# Patient Record
Sex: FEMALE | Race: BLACK OR AFRICAN AMERICAN | NOT HISPANIC OR LATINO | Employment: FULL TIME | URBAN - METROPOLITAN AREA
[De-identification: names, ages, dates, MRNs, and addresses within clinical notes are randomized per-mention and may not be internally consistent; named-entity substitution may affect disease eponyms.]

---

## 2018-08-25 ENCOUNTER — HOSPITAL ENCOUNTER (EMERGENCY)
Facility: HOSPITAL | Age: 48
Discharge: HOME | End: 2018-08-25
Attending: EMERGENCY MEDICINE | Admitting: EMERGENCY MEDICINE
Payer: COMMERCIAL

## 2018-08-25 ENCOUNTER — APPOINTMENT (EMERGENCY)
Dept: RADIOLOGY | Facility: HOSPITAL | Age: 48
End: 2018-08-25
Payer: COMMERCIAL

## 2018-08-25 ENCOUNTER — OFFICE VISIT (OUTPATIENT)
Dept: URGENT CARE | Facility: CLINIC | Age: 48
End: 2018-08-25

## 2018-08-25 VITALS
WEIGHT: 201.28 LBS | HEART RATE: 77 BPM | TEMPERATURE: 98.6 F | RESPIRATION RATE: 20 BRPM | OXYGEN SATURATION: 98 % | DIASTOLIC BLOOD PRESSURE: 76 MMHG | SYSTOLIC BLOOD PRESSURE: 128 MMHG | HEIGHT: 65 IN | BODY MASS INDEX: 33.54 KG/M2

## 2018-08-25 VITALS
RESPIRATION RATE: 20 BRPM | OXYGEN SATURATION: 98 % | SYSTOLIC BLOOD PRESSURE: 146 MMHG | HEART RATE: 83 BPM | DIASTOLIC BLOOD PRESSURE: 89 MMHG

## 2018-08-25 DIAGNOSIS — R07.9 CHEST PAIN: Primary | ICD-10-CM

## 2018-08-25 DIAGNOSIS — R07.9 CHEST PAIN, UNSPECIFIED TYPE: Primary | ICD-10-CM

## 2018-08-25 DIAGNOSIS — R11.0 NAUSEA: ICD-10-CM

## 2018-08-25 LAB
ALBUMIN SERPL BCP-MCNC: 3.7 G/DL (ref 3.5–5)
ALP SERPL-CCNC: 136 U/L (ref 46–116)
ALT SERPL W P-5'-P-CCNC: 43 U/L (ref 12–78)
ANION GAP SERPL CALCULATED.3IONS-SCNC: 7 MMOL/L (ref 4–13)
AST SERPL W P-5'-P-CCNC: 38 U/L (ref 5–45)
BASOPHILS # BLD AUTO: 0.02 THOUSANDS/ΜL (ref 0–0.1)
BASOPHILS NFR BLD AUTO: 0 % (ref 0–1)
BILIRUB SERPL-MCNC: 0.4 MG/DL (ref 0.2–1)
BUN SERPL-MCNC: 8 MG/DL (ref 5–25)
CALCIUM SERPL-MCNC: 9.4 MG/DL (ref 8.3–10.1)
CHLORIDE SERPL-SCNC: 106 MMOL/L (ref 100–108)
CO2 SERPL-SCNC: 31 MMOL/L (ref 21–32)
CREAT SERPL-MCNC: 0.88 MG/DL (ref 0.6–1.3)
EOSINOPHIL # BLD AUTO: 0.15 THOUSAND/ΜL (ref 0–0.61)
EOSINOPHIL NFR BLD AUTO: 3 % (ref 0–6)
ERYTHROCYTE [DISTWIDTH] IN BLOOD BY AUTOMATED COUNT: 13 % (ref 11.6–15.1)
EXT PREG TEST URINE: NEGATIVE
GFR SERPL CREATININE-BSD FRML MDRD: 91 ML/MIN/1.73SQ M
GLUCOSE SERPL-MCNC: 83 MG/DL (ref 65–140)
HCT VFR BLD AUTO: 44.5 % (ref 34.8–46.1)
HGB BLD-MCNC: 14.2 G/DL (ref 11.5–15.4)
IMM GRANULOCYTES # BLD AUTO: 0.02 THOUSAND/UL (ref 0–0.2)
IMM GRANULOCYTES NFR BLD AUTO: 0 % (ref 0–2)
LIPASE SERPL-CCNC: 175 U/L (ref 73–393)
LYMPHOCYTES # BLD AUTO: 0.97 THOUSANDS/ΜL (ref 0.6–4.47)
LYMPHOCYTES NFR BLD AUTO: 19 % (ref 14–44)
MCH RBC QN AUTO: 30.5 PG (ref 26.8–34.3)
MCHC RBC AUTO-ENTMCNC: 31.9 G/DL (ref 31.4–37.4)
MCV RBC AUTO: 96 FL (ref 82–98)
MONOCYTES # BLD AUTO: 0.73 THOUSAND/ΜL (ref 0.17–1.22)
MONOCYTES NFR BLD AUTO: 14 % (ref 4–12)
NEUTROPHILS # BLD AUTO: 3.33 THOUSANDS/ΜL (ref 1.85–7.62)
NEUTS SEG NFR BLD AUTO: 64 % (ref 43–75)
NRBC BLD AUTO-RTO: 0 /100 WBCS
PLATELET # BLD AUTO: 258 THOUSANDS/UL (ref 149–390)
PMV BLD AUTO: 9.6 FL (ref 8.9–12.7)
POTASSIUM SERPL-SCNC: 3.7 MMOL/L (ref 3.5–5.3)
PROT SERPL-MCNC: 8.5 G/DL (ref 6.4–8.2)
RBC # BLD AUTO: 4.66 MILLION/UL (ref 3.81–5.12)
SODIUM SERPL-SCNC: 144 MMOL/L (ref 136–145)
TROPONIN I SERPL-MCNC: <0.02 NG/ML
TROPONIN I SERPL-MCNC: <0.02 NG/ML
WBC # BLD AUTO: 5.22 THOUSAND/UL (ref 4.31–10.16)

## 2018-08-25 PROCEDURE — 96361 HYDRATE IV INFUSION ADD-ON: CPT

## 2018-08-25 PROCEDURE — 71046 X-RAY EXAM CHEST 2 VIEWS: CPT

## 2018-08-25 PROCEDURE — 99213 OFFICE O/P EST LOW 20 MIN: CPT | Performed by: PHYSICIAN ASSISTANT

## 2018-08-25 PROCEDURE — 80053 COMPREHEN METABOLIC PANEL: CPT | Performed by: EMERGENCY MEDICINE

## 2018-08-25 PROCEDURE — 83690 ASSAY OF LIPASE: CPT | Performed by: EMERGENCY MEDICINE

## 2018-08-25 PROCEDURE — 99285 EMERGENCY DEPT VISIT HI MDM: CPT

## 2018-08-25 PROCEDURE — 96375 TX/PRO/DX INJ NEW DRUG ADDON: CPT

## 2018-08-25 PROCEDURE — 85025 COMPLETE CBC W/AUTO DIFF WBC: CPT | Performed by: EMERGENCY MEDICINE

## 2018-08-25 PROCEDURE — 36415 COLL VENOUS BLD VENIPUNCTURE: CPT | Performed by: EMERGENCY MEDICINE

## 2018-08-25 PROCEDURE — 93005 ELECTROCARDIOGRAM TRACING: CPT | Performed by: PHYSICIAN ASSISTANT

## 2018-08-25 PROCEDURE — 81025 URINE PREGNANCY TEST: CPT | Performed by: EMERGENCY MEDICINE

## 2018-08-25 PROCEDURE — 84484 ASSAY OF TROPONIN QUANT: CPT | Performed by: EMERGENCY MEDICINE

## 2018-08-25 PROCEDURE — 96374 THER/PROPH/DIAG INJ IV PUSH: CPT

## 2018-08-25 RX ORDER — PREDNISONE 10 MG/1
TABLET ORAL DAILY
COMMUNITY

## 2018-08-25 RX ORDER — ASPIRIN 81 MG/1
324 TABLET, CHEWABLE ORAL ONCE
Status: COMPLETED | OUTPATIENT
Start: 2018-08-25 | End: 2018-08-25

## 2018-08-25 RX ORDER — FAMOTIDINE 20 MG/1
40 TABLET, FILM COATED ORAL 2 TIMES DAILY
Qty: 20 TABLET | Refills: 0 | Status: SHIPPED | OUTPATIENT
Start: 2018-08-25 | End: 2018-09-04

## 2018-08-25 RX ORDER — MAGNESIUM HYDROXIDE/ALUMINUM HYDROXICE/SIMETHICONE 120; 1200; 1200 MG/30ML; MG/30ML; MG/30ML
15 SUSPENSION ORAL ONCE
Status: COMPLETED | OUTPATIENT
Start: 2018-08-25 | End: 2018-08-25

## 2018-08-25 RX ORDER — ONDANSETRON 4 MG/1
4 TABLET, ORALLY DISINTEGRATING ORAL EVERY 8 HOURS PRN
Qty: 12 TABLET | Refills: 0 | Status: SHIPPED | OUTPATIENT
Start: 2018-08-25 | End: 2018-08-28

## 2018-08-25 RX ORDER — ALBUTEROL SULFATE 90 UG/1
2 AEROSOL, METERED RESPIRATORY (INHALATION) EVERY 6 HOURS PRN
COMMUNITY

## 2018-08-25 RX ORDER — KETOROLAC TROMETHAMINE 30 MG/ML
15 INJECTION, SOLUTION INTRAMUSCULAR; INTRAVENOUS ONCE
Status: COMPLETED | OUTPATIENT
Start: 2018-08-25 | End: 2018-08-25

## 2018-08-25 RX ADMIN — KETOROLAC TROMETHAMINE 15 MG: 30 INJECTION, SOLUTION INTRAMUSCULAR at 17:59

## 2018-08-25 RX ADMIN — ASPIRIN 81 MG 324 MG: 81 TABLET ORAL at 15:58

## 2018-08-25 RX ADMIN — ALUMINUM HYDROXIDE, MAGNESIUM HYDROXIDE, AND SIMETHICONE 15 ML: 200; 200; 20 SUSPENSION ORAL at 16:01

## 2018-08-25 RX ADMIN — SODIUM CHLORIDE 1000 ML: 0.9 INJECTION, SOLUTION INTRAVENOUS at 16:32

## 2018-08-25 RX ADMIN — LIDOCAINE HYDROCHLORIDE 15 ML: 20 SOLUTION ORAL; TOPICAL at 16:00

## 2018-08-25 RX ADMIN — FAMOTIDINE 20 MG: 10 INJECTION, SOLUTION INTRAVENOUS at 17:59

## 2018-08-25 NOTE — ED PROVIDER NOTES
History  Chief Complaint   Patient presents with    Chest Pain     pt co of midsternal chest pain onset 2 days ago, + jaw pain,nausea, dizzines, and SOB     53-year-old female with a history of sarcoidosis on chronic prednisone presenting with chief complaint of chest pain from urgent care  Patient is here with her significant other she reports that 2 days ago she developed a burning sensation in the center of her chest does go up and down her chest described as uncomfortable in associated with nausea, it was intermittent and did wax and wane, is been constant since last night though  The pain is substernal the center of her chest it is otherwise nonradiating it does not go to her back or shoulders it is not pleuritic it does not go up into her neck and jaw her face, again no other exacerbating remitting factors she is so she has this with nausea  In regards to her noted jaw pain she states that she has had some mild tooth discomfort on the upper right since Tuesday which pre seated this, this did not radiate into her neck or chest is worse when she chews that reproduces her symptoms exactly she has no facial swelling throat pain or difficulty swallowing  She also notes that she has sarcoid or shortness of breath she believes at baseline    She otherwise does follow up with the cardiologist for her sarcoidosis, she denies recent exertional symptoms, her current symptoms again have been constant onset her not worse with exertion she denies all risk factors for ACS including cocaine or methamphetamine use, and the exception of family history, she was adopted does not her personal family history, she denies all risk factors signs of symptoms of DVT or PE she denies recent fevers or chills complaints of headache neck pain difficulty swallowing she denies back pain cough production or hemoptysis near syncope diaphoresis vomiting abdominal pain flank pain urinary symptoms joint pain swelling leg swelling calf swelling or other associated symptoms  Complete review systems otherwise negative as noted            Prior to Admission Medications   Prescriptions Last Dose Informant Patient Reported? Taking? albuterol (PROVENTIL HFA,VENTOLIN HFA) 90 mcg/act inhaler  Self Yes No   Sig: Inhale 2 puffs every 6 (six) hours as needed for wheezing   methotrexate 2 5 mg tablet  Self Yes No   Sig: Take by mouth once a week   predniSONE 10 mg tablet  Self Yes No   Sig: Take by mouth daily      Facility-Administered Medications: None       Past Medical History:   Diagnosis Date    Sarcoidosis        Past Surgical History:   Procedure Laterality Date     SECTION      LUNG BIOPSY         Family History   Problem Relation Age of Onset    Adopted: Yes     I have reviewed and agree with the history as documented  Social History   Substance Use Topics    Smoking status: Never Smoker    Smokeless tobacco: Never Used    Alcohol use No        Review of Systems   Constitutional: Negative for activity change, appetite change, chills, diaphoresis, fatigue and fever  HENT: Negative for rhinorrhea and sore throat  Eyes: Negative for photophobia and pain  Respiratory: Positive for shortness of breath  Negative for cough  Cardiovascular: Positive for chest pain  Negative for palpitations  Gastrointestinal: Positive for nausea  Negative for abdominal distention, abdominal pain, diarrhea and vomiting  Genitourinary: Negative for difficulty urinating, dysuria, frequency and urgency  Musculoskeletal: Negative for arthralgias, back pain and myalgias  Skin: Negative for color change and rash  Neurological: Negative for weakness, numbness and headaches  Hematological: Negative for adenopathy  Does not bruise/bleed easily  Psychiatric/Behavioral: Negative for agitation and behavioral problems  All other systems reviewed and are negative        Physical Exam  Physical Exam   Constitutional: She is oriented to person, place, and time  She appears well-developed and well-nourished  No distress  Very well-appearing no acute distress   HENT:   Head: Normocephalic and atraumatic  Eyes: EOM are normal  Pupils are equal, round, and reactive to light  Neck: Normal range of motion  Neck supple  No tracheal deviation present  Cardiovascular: Normal rate, regular rhythm and normal heart sounds  Exam reveals no gallop and no friction rub  No murmur heard  Pulmonary/Chest: Effort normal and breath sounds normal  She has no wheezes  She has no rales  Abdominal: Soft  Bowel sounds are normal  She exhibits no distension  There is no tenderness  There is no rebound and no guarding  No abdominal tenderness no flank or CVA tenderness   Musculoskeletal: Normal range of motion  She exhibits no edema or tenderness  Neurological: She is alert and oriented to person, place, and time  No cranial nerve deficit  She exhibits normal muscle tone  Coordination normal    Skin: Skin is warm and dry  No rash noted  Psychiatric: She has a normal mood and affect  Her behavior is normal    Nursing note and vitals reviewed        Vital Signs  ED Triage Vitals   Temperature Pulse Respirations Blood Pressure SpO2   08/25/18 1631 08/25/18 1528 08/25/18 1528 08/25/18 1528 08/25/18 1528   98 6 °F (37 °C) 81 20 143/77 98 %      Temp Source Heart Rate Source Patient Position - Orthostatic VS BP Location FiO2 (%)   08/25/18 1631 08/25/18 1528 08/25/18 1528 08/25/18 1528 --   Oral Monitor Lying Right arm       Pain Score       08/25/18 1528       7           Vitals:    08/25/18 1632 08/25/18 1700 08/25/18 1833 08/25/18 1845   BP: 113/64 120/70 128/76    Pulse: 75 75 76 77   Patient Position - Orthostatic VS:  Lying Lying        Visual Acuity      ED Medications  Medications   sodium chloride 0 9 % bolus 1,000 mL (0 mL Intravenous Stopped 8/25/18 1936)   lidocaine viscous (XYLOCAINE) 2 % mucosal solution 15 mL (15 mL Swish & Swallow Given 8/25/18 1600)   aluminum-magnesium hydroxide-simethicone (MYLANTA) 200-200-20 mg/5 mL oral suspension 15 mL (15 mL Oral Given 8/25/18 1601)   aspirin chewable tablet 324 mg (324 mg Oral Given 8/25/18 1558)   famotidine (PEPCID) injection 20 mg (20 mg Intravenous Given 8/25/18 1759)   ketorolac (TORADOL) injection 15 mg (15 mg Intravenous Given 8/25/18 1759)       Diagnostic Studies  Results Reviewed     Procedure Component Value Units Date/Time    Troponin I [[id number]]  (Normal) Collected:  08/25/18 1831    Lab Status:  Final result Specimen:  Blood from Arm, Right Updated:  08/25/18 1857     Troponin I <0 02 ng/mL     Comprehensive metabolic panel [[id number]]  (Abnormal) Collected:  08/25/18 1618    Lab Status:  Final result Specimen:  Blood from Arm, Right Updated:  08/25/18 1650     Sodium 144 mmol/L      Potassium 3 7 mmol/L      Chloride 106 mmol/L      CO2 31 mmol/L      Anion Gap 7 mmol/L      BUN 8 mg/dL      Creatinine 0 88 mg/dL      Glucose 83 mg/dL      Calcium 9 4 mg/dL      AST 38 U/L      ALT 43 U/L      Alkaline Phosphatase 136 (H) U/L      Total Protein 8 5 (H) g/dL      Albumin 3 7 g/dL      Total Bilirubin 0 40 mg/dL      eGFR 91 ml/min/1 73sq m     Narrative:         National Kidney Disease Education Program recommendations are as follows:  GFR calculation is accurate only with a steady state creatinine  Chronic Kidney disease less than 60 ml/min/1 73 sq  meters  Kidney failure less than 15 ml/min/1 73 sq  meters      Lipase [[id number]]  (Normal) Collected:  08/25/18 1618    Lab Status:  Final result Specimen:  Blood from Arm, Right Updated:  08/25/18 1650     Lipase 175 u/L     Troponin I [[id number]]  (Normal) Collected:  08/25/18 1618    Lab Status:  Final result Specimen:  Blood from Arm, Right Updated:  08/25/18 1647     Troponin I <0 02 ng/mL     CBC and differential [[id number]]  (Abnormal) Collected:  08/25/18 1618    Lab Status:  Final result Specimen:  Blood from Arm, Right Updated: 08/25/18 1626     WBC 5 22 Thousand/uL      RBC 4 66 Million/uL      Hemoglobin 14 2 g/dL      Hematocrit 44 5 %      MCV 96 fL      MCH 30 5 pg      MCHC 31 9 g/dL      RDW 13 0 %      MPV 9 6 fL      Platelets 673 Thousands/uL      nRBC 0 /100 WBCs      Neutrophils Relative 64 %      Immat GRANS % 0 %      Lymphocytes Relative 19 %      Monocytes Relative 14 (H) %      Eosinophils Relative 3 %      Basophils Relative 0 %      Neutrophils Absolute 3 33 Thousands/µL      Immature Grans Absolute 0 02 Thousand/uL      Lymphocytes Absolute 0 97 Thousands/µL      Monocytes Absolute 0 73 Thousand/µL      Eosinophils Absolute 0 15 Thousand/µL      Basophils Absolute 0 02 Thousands/µL     POCT pregnancy, urine [[id number]]  (Normal) Resulted:  08/25/18 1604    Lab Status:  Final result Updated:  08/25/18 1605     EXT PREG TEST UR (Ref: Negative) negative                 XR chest 2 views   ED Interpretation by Anne Gardiner DO (08/25 1749)   Diffuse interstitial lung disease, patient has history of sarcoidosis on prednisone no dense consolidation or effusion                 Procedures  Procedures       Phone Contacts  ED Phone Contact    ED Course  ED Course as of Aug 25 2245   Sat Aug 25, 2018   1557 EKG reviewed normal sinus rhythm 76 beats per minute normal access normal intervals T-wave inversion in lead 1 and aVL only no other acute ST or T-wave segment abnormalities no previous EKGs    1648 Troponin I: <0 02   1751 Patient was significantly improved after receiving GI cocktail she still has some mild discomfort though, again is been constant since last night burning in the center of her chest with nausea, will remitting Mohamud Na, delta troponin pending disposition pending    1858 Repeat EKG reviewed normal sinus rhythm 81 beats per minute normal axis normal intervals this is unchanged from EKG performed 3 hours earlier, there are no dynamic changes    1858 Patient understandsAnd agrees to discharge return instructions heart score of 3 PERC 0, will follow-up as instructed, understands agrees to plan          HEART Risk Score      Most Recent Value   History  1 Filed at: 08/25/2018 1651   ECG  1 Filed at: 08/25/2018 1651   Age  1 Filed at: 08/25/2018 1651   Risk Factors  0 Filed at: 08/25/2018 1651   Troponin  0 Filed at: 08/25/2018 1651   Heart Score Risk Calculator   History  1 Filed at: 08/25/2018 1651   ECG  1 Filed at: 08/25/2018 1651   Age  1 Filed at: 08/25/2018 1651   Risk Factors  0 Filed at: 08/25/2018 1651   Troponin  0 Filed at: 08/25/2018 1651   HEART Score  3 Filed at: 08/25/2018 1651   HEART Score  3 Filed at: 08/25/2018 1651                            MDM  Number of Diagnoses or Management Options  Chest pain:   Nausea:   Diagnosis management comments: 62-year-old female with a history of sarcoidosis on prednisone with chief complaint of primarily chest pain, came on at rest described as central burning sharp stabbing the center of her chest, associated with nausea, is not exertional otherwise nonradiating without other exacerbating or remitting factors no other associated symptoms regards to this, it is specifically not worsen with exertion is not better with rest and has absolutely been constant since last night approximately 24 hours ago she has no abdominal tenderness with this, patient denies all risk factors for ACS denies recent exertional chest pain or other cardiac equivalents she denies all risk factors signs of symptoms of DVT or PE PERC 0, will check EKG, troponin, possibly discharge if able with heart score of 3 or less and outpatient stress, in regards to the shortness of breath noted on the triage note the patient states that her shortness of breath is at baseline she follows with with her specialists for this without other acute changes production hemoptysis or fever, again not worsened with exertion, and regards to the noted jaw pain the patient states that she has some mild dental pain that she gets from time to time this preceded her chest discomfort does not radiate into her neck or jaw or chest is worse with chewing, not worse with exertion she has no difficulty swallowing facial pain or swelling her head neck and face are unremarkable tapping on her tooth reproduces symptoms,, will treat symptomatically, disposition pending further evaluation and reassessment    CritCare Time    Disposition  Final diagnoses:   Chest pain   Nausea     Time reflects when diagnosis was documented in both MDM as applicable and the Disposition within this note     Time User Action Codes Description Comment    8/25/2018  7:15 PM Don Cook Add [R07 9] Chest pain     8/25/2018  7:15 PM Noreen Vee Add [R11 0] Nausea       ED Disposition     ED Disposition Condition Comment    Discharge  Mitcheal Goodpasture discharge to home/self care      Condition at discharge: Good        Follow-up Information     Follow up With Specialties Details Why Contact Info Additional Information    [facility] Emergency Department Emergency Medicine  If symptoms worsen [address] [facility] [address]  [phone] [place] ED, [address] [place] [address], [id number]          Discharge Medication List as of 8/25/2018  7:17 PM      START taking these medications    Details   famotidine (PEPCID) 20 mg tablet Take 2 tablets (40 mg total) by mouth 2 (two) times a day for 10 days, Starting Sat 8/25/2018, Until Tue 9/4/2018, Print      ondansetron (ZOFRAN-ODT) 4 mg disintegrating tablet Take 1 tablet (4 mg total) by mouth every 8 (eight) hours as needed for nausea for up to 3 days, Starting Sat 8/25/2018, Until Tue 8/28/2018, Print         CONTINUE these medications which have NOT CHANGED    Details   albuterol (PROVENTIL HFA,VENTOLIN HFA) 90 mcg/act inhaler Inhale 2 puffs every 6 (six) hours as needed for wheezing, Historical Med      methotrexate 2 5 mg tablet Take by mouth once a week, Historical Med predniSONE 10 mg tablet Take by mouth daily, Historical Med             Outpatient Discharge Orders  NM myocardial perfusion spect (rx stress and/or rest)   Standing Status: Future  Standing Exp   Date: 08/25/22         ED Provider  Electronically Signed by           Lo Renae DO  08/25/18 6371

## 2018-08-25 NOTE — DISCHARGE INSTRUCTIONS
Please return immediately symptoms change become worse developed worsening or other concerning symptoms otherwise please follow up as instructed as discussed     Chest Pain   WHAT YOU NEED TO KNOW:   Chest pain can be caused by a range of conditions, from not serious to life-threatening  Chest pain can be a symptom of a digestive problem, such as acid reflux or a stomach ulcer  An anxiety attack or a strong emotion, such as anger, can also cause chest pain  Infection, inflammation, or a fracture in the bones or cartilage in your chest can cause pain or discomfort  Sometimes chest pain or pressure is caused by poor blood flow to your heart (angina)  Chest pain may also be caused by life-threatening conditions such as a heart attack or blood clot in your lungs  DISCHARGE INSTRUCTIONS:   Call 911 if:   · You have any of the following signs of a heart attack:      ¨ Squeezing, pressure, or pain in your chest that lasts longer than 5 minutes or returns    ¨ Discomfort or pain in your back, neck, jaw, stomach, or arm     ¨ Trouble breathing    ¨ Nausea or vomiting    ¨ Lightheadedness or a sudden cold sweat, especially with chest pain or trouble breathing    Return to the emergency department if:   · You have chest discomfort that gets worse, even with medicine  · You cough or vomit blood  · Your bowel movements are black or bloody  · You cannot stop vomiting, or it hurts to swallow  Contact your healthcare provider if:   · You have questions or concerns about your condition or care  Medicines:   · Medicines  may be given to treat the cause of your chest pain  Examples include pain medicine, anxiety medicine, or medicines to increase blood flow to your heart  · Do not take certain medicines without asking your healthcare provider first   These include NSAIDs, herbal or vitamin supplements, or hormones (estrogen or progestin)  · Take your medicine as directed    Contact your healthcare provider if you think your medicine is not helping or if you have side effects  Tell him or her if you are allergic to any medicine  Keep a list of the medicines, vitamins, and herbs you take  Include the amounts, and when and why you take them  Bring the list or the pill bottles to follow-up visits  Carry your medicine list with you in case of an emergency  Follow up with your healthcare provider within 72 hours, or as directed: You may need to return for more tests to find the cause of your chest pain  You may be referred to a specialist, such as a cardiologist or gastroenterologist  Write down your questions so you remember to ask them during your visits  Healthy living tips: The following are general healthy guidelines  If your chest pain is caused by a heart problem, your healthcare provider will give you specific guidelines to follow  · Do not smoke  Nicotine and other chemicals in cigarettes and cigars can cause lung and heart damage  Ask your healthcare provider for information if you currently smoke and need help to quit  E-cigarettes or smokeless tobacco still contain nicotine  Talk to your healthcare provider before you use these products  · Eat a variety of healthy, low-fat foods  Healthy foods include fruits, vegetables, whole-grain breads, low-fat dairy products, beans, lean meats, and fish  Ask for more information about a heart healthy diet  · Ask about activity  Your healthcare provider will tell you which activities to limit or avoid  Ask when you can drive, return to work, and have sex  Ask about the best exercise plan for you  · Maintain a healthy weight  Ask your healthcare provider how much you should weigh  Ask him or her to help you create a weight loss plan if you are overweight  · Get the flu and pneumonia vaccines  All adults should get the influenza (flu) vaccine  Get it every year as soon as it becomes available   The pneumococcal vaccine is given to adults aged 72 years or older  The vaccine is given every 5 years to prevent pneumococcal disease, such as pneumonia  © 2017 [employer] Information is for End User's use only and may not be sold, redistributed or otherwise used for commercial purposes  All illustrations and images included in CareNotes® are the copyrighted property of [employer]  or P[employer] Olive[employer]  The above information is an  only  It is not intended as medical advice for individual conditions or treatments  Talk to your doctor, nurse or pharmacist before following any medical regimen to see if it is safe and effective for you

## 2018-08-25 NOTE — PROGRESS NOTES
[facility]        NAME: Ant Ghotra is a 57 y o  female  : 1970    MRN: [id number]  DATE: 2018  TIME: 8:50 AM    Assessment and Plan   Chest pain, unspecified type [R07 9]  1  Chest pain, unspecified type  POCT ECG    Transfer to other facility         Patient Instructions     Due to chest pain, symptoms of SOB and no labs here refer to ER for evaluation Offered EMS transport, patient refused saying her  will take her  Chief Complaint     Chief Complaint   Patient presents with    Chest Pain     x 2 days    Shortness of Breath         History of Present Illness         30-year-old female complains of shortness of breath and central chest pain that she feels like a squeezing or burning for 2 days  She says it has progressed and gotten more constant over the last day  She feels short of breath when she gets up and walks  She was short of breath going up steps  She does have a history of sarcoidosis  She says a few months ago she was seen for an irregular heartbeat  She was told she had PVCs  She had a regular nuclear stress test and regular exercise stress test   She says this is worse than her normal heartburn  She had some pain radiating into her jaw  No arm pain  No dizziness  No sweating  Last night she felt nauseous and did not feel well but that subsided after few minutes          Review of Systems   Review of Systems      Current Medications       Current Outpatient Prescriptions:     albuterol (PROVENTIL HFA,VENTOLIN HFA) 90 mcg/act inhaler, Inhale 2 puffs every 6 (six) hours as needed for wheezing, Disp: , Rfl:     methotrexate 2 5 mg tablet, Take by mouth once a week, Disp: , Rfl:     predniSONE 10 mg tablet, Take by mouth daily, Disp: , Rfl:     famotidine (PEPCID) 20 mg tablet, Take 2 tablets (40 mg total) by mouth 2 (two) times a day for 10 days, Disp: 20 tablet, Rfl: 0    ondansetron (ZOFRAN-ODT) 4 mg disintegrating tablet, Take 1 tablet (4 mg total) by mouth every 8 (eight) hours as needed for nausea for up to 3 days, Disp: 12 tablet, Rfl: 0    Current Allergies     Allergies as of 2018    (No Known Allergies)            The following portions of the patient's history were reviewed and updated as appropriate: allergies, current medications, past family history, past medical history, past social history, past surgical history and problem list      Past Medical History:   Diagnosis Date    Sarcoidosis        Past Surgical History:   Procedure Laterality Date     SECTION      LUNG BIOPSY         Family History   Problem Relation Age of Onset    Adopted: Yes         Medications have been verified  Objective   /89 (BP Location: Right arm, Patient Position: Sitting, Cuff Size: Standard)   Pulse 83   Resp 20   SpO2 98%        Physical Exam     Physical Exam   Constitutional: She appears well-developed and well-nourished  Cardiovascular: Normal rate, regular rhythm, normal heart sounds and intact distal pulses  Pulmonary/Chest: Effort normal  No respiratory distress  She has no wheezes  She has no rales  She exhibits no tenderness  Abdominal: Soft  Bowel sounds are normal  She exhibits no distension  There is no tenderness  EKG shows NRS, ?  Septal infarct but no ST elevation or arrhythmia

## 2018-08-27 LAB
ATRIAL RATE: 76 BPM
ATRIAL RATE: 78 BPM
ATRIAL RATE: 81 BPM
P AXIS: 33 DEGREES
P AXIS: 37 DEGREES
P AXIS: 47 DEGREES
PR INTERVAL: 166 MS
PR INTERVAL: 168 MS
PR INTERVAL: 168 MS
QRS AXIS: 44 DEGREES
QRS AXIS: 60 DEGREES
QRS AXIS: 68 DEGREES
QRSD INTERVAL: 72 MS
QRSD INTERVAL: 76 MS
QRSD INTERVAL: 82 MS
QT INTERVAL: 380 MS
QT INTERVAL: 390 MS
QT INTERVAL: 390 MS
QTC INTERVAL: 427 MS
QTC INTERVAL: 444 MS
QTC INTERVAL: 453 MS
T WAVE AXIS: 81 DEGREES
T WAVE AXIS: 83 DEGREES
T WAVE AXIS: 85 DEGREES
VENTRICULAR RATE: 76 BPM
VENTRICULAR RATE: 78 BPM
VENTRICULAR RATE: 81 BPM

## 2018-08-27 PROCEDURE — 93010 ELECTROCARDIOGRAM REPORT: CPT | Performed by: INTERNAL MEDICINE
